# Patient Record
Sex: MALE | Race: WHITE | HISPANIC OR LATINO | URBAN - METROPOLITAN AREA
[De-identification: names, ages, dates, MRNs, and addresses within clinical notes are randomized per-mention and may not be internally consistent; named-entity substitution may affect disease eponyms.]

---

## 2023-09-20 ENCOUNTER — LAB OUTSIDE AN ENCOUNTER (OUTPATIENT)
Dept: URBAN - METROPOLITAN AREA CLINIC 63 | Facility: CLINIC | Age: 63
End: 2023-09-20

## 2023-09-20 ENCOUNTER — OFFICE VISIT (OUTPATIENT)
Dept: URBAN - METROPOLITAN AREA CLINIC 63 | Facility: CLINIC | Age: 63
End: 2023-09-20
Payer: SELF-PAY

## 2023-09-20 ENCOUNTER — WEB ENCOUNTER (OUTPATIENT)
Dept: URBAN - METROPOLITAN AREA CLINIC 63 | Facility: CLINIC | Age: 63
End: 2023-09-20

## 2023-09-20 VITALS
OXYGEN SATURATION: 98 % | SYSTOLIC BLOOD PRESSURE: 138 MMHG | DIASTOLIC BLOOD PRESSURE: 82 MMHG | WEIGHT: 204 LBS | TEMPERATURE: 98.1 F | HEIGHT: 69 IN | HEART RATE: 78 BPM | BODY MASS INDEX: 30.21 KG/M2

## 2023-09-20 DIAGNOSIS — R63.4 ABNORMAL WEIGHT LOSS: ICD-10-CM

## 2023-09-20 DIAGNOSIS — K21.9 GASTROESOPHAGEAL REFLUX DISEASE WITHOUT ESOPHAGITIS: ICD-10-CM

## 2023-09-20 DIAGNOSIS — K59.09 OTHER CONSTIPATION: ICD-10-CM

## 2023-09-20 DIAGNOSIS — R10.13 EPIGASTRIC PAIN: ICD-10-CM

## 2023-09-20 DIAGNOSIS — Z12.11 ENCOUNTER FOR SCREENING FOR MALIGNANT NEOPLASM OF COLON: ICD-10-CM

## 2023-09-20 PROBLEM — 266435005: Status: ACTIVE | Noted: 2023-09-20

## 2023-09-20 PROCEDURE — 99204 OFFICE O/P NEW MOD 45 MIN: CPT | Performed by: PHYSICIAN ASSISTANT

## 2023-09-20 RX ORDER — POLYETHYLENE GLYCOL 3350, SODIUM CHLORIDE, SODIUM BICARBONATE, POTASSIUM CHLORIDE 420; 11.2; 5.72; 1.48 G/4L; G/4L; G/4L; G/4L
AS DIRECTED POWDER, FOR SOLUTION ORAL ONCE
Qty: 4000 | Refills: 0 | OUTPATIENT
Start: 2023-09-20 | End: 2023-09-21

## 2023-09-20 RX ORDER — FAMOTIDINE 20 MG/1
1 TABLET AS NEEDED TABLET, FILM COATED ORAL ONCE A DAY
Qty: 30 TABLET | Refills: 2 | OUTPATIENT
Start: 2023-09-20

## 2023-09-20 RX ORDER — DICYCLOMINE HYDROCHLORIDE 10 MG/1
2 CAPSULES CAPSULE ORAL THREE TIMES A DAY
Status: ACTIVE | COMMUNITY

## 2023-09-20 RX ORDER — ONDANSETRON HYDROCHLORIDE 4 MG/1
1 TABLET TABLET, FILM COATED ORAL
Qty: 2 | Refills: 0 | OUTPATIENT
Start: 2023-09-20

## 2023-09-20 NOTE — PHYSICAL EXAM CHEST:
chest wall non-tender, breathing is unlabored without accessory muscle use, normal breath sounds n/a

## 2023-09-20 NOTE — HPI-TODAY'S VISIT:
Elías is a pleasant 62-year-old male who presents today for evaluation of a screening colonoscopy  CT abdomen/pelvis with contrast dated 8/13/2023 showed scattered diverticulum.  No evidence of diverticulitis.  No focal inflammatory process.  Fluid-filled stomach, small intestinal loops.  Findings suggesting gastroenteritis  Labs dated 8/13/2023 showed a normal hemoglobin.  Normal platelets.  Normal renal function.  Normal LFTs.  Normal lipase.  Patient is EGD and colonoscopy naive. PCP gave him Bentyl but this was ineffective. He notes epigastric pain and intermittent reflux at night. Notes a bowel movement every 2-3 days despite prune juice. 20 pound weight loss over the last month unintentionally. Denies nausea, vomiting, dysphagia, odynophagia, hematemesis, coffee ground emesis, BRBPR or melena. Denies family history of colon cancer or colon polyps. No other GI complaints at this time

## 2023-09-27 ENCOUNTER — LAB OUTSIDE AN ENCOUNTER (OUTPATIENT)
Dept: URBAN - METROPOLITAN AREA CLINIC 63 | Facility: CLINIC | Age: 63
End: 2023-09-27

## 2023-10-05 ENCOUNTER — TELEPHONE ENCOUNTER (OUTPATIENT)
Dept: URBAN - METROPOLITAN AREA CLINIC 63 | Facility: CLINIC | Age: 63
End: 2023-10-05

## 2023-10-05 RX ORDER — POLYETHYLENE GLYCOL 3350, SODIUM CHLORIDE, SODIUM BICARBONATE, POTASSIUM CHLORIDE 420; 11.2; 5.72; 1.48 G/4L; G/4L; G/4L; G/4L
AS DIRECTED POWDER, FOR SOLUTION ORAL ONCE
Qty: 4000 | Refills: 0
Start: 2023-09-20 | End: 2023-10-06

## 2023-10-24 ENCOUNTER — OUT OF OFFICE VISIT (OUTPATIENT)
Dept: URBAN - METROPOLITAN AREA SURGERY CENTER 4 | Facility: SURGERY CENTER | Age: 63
End: 2023-10-24
Payer: SELF-PAY

## 2023-10-24 ENCOUNTER — CLAIMS CREATED FROM THE CLAIM WINDOW (OUTPATIENT)
Dept: URBAN - METROPOLITAN AREA CLINIC 4 | Facility: CLINIC | Age: 63
End: 2023-10-24
Payer: SELF-PAY

## 2023-10-24 DIAGNOSIS — K22.89 OTHER SPECIFIED DISEASE OF ESOPHAGUS: ICD-10-CM

## 2023-10-24 DIAGNOSIS — B96.81 HELICOBACTER PYLORI [H. PYLORI] AS THE CAUSE OF DISEASES CLASSIFIED ELSEWHERE: ICD-10-CM

## 2023-10-24 DIAGNOSIS — R10.13 EPIGASTRIC ABDOMINAL PAIN: ICD-10-CM

## 2023-10-24 DIAGNOSIS — K21.9 ESOPHAGEAL REFLUX: ICD-10-CM

## 2023-10-24 DIAGNOSIS — K63.5 POLYP OF TRANSVERSE COLON, UNSPECIFIED TYPE: ICD-10-CM

## 2023-10-24 DIAGNOSIS — K64.8 OTHER HEMORRHOIDS: ICD-10-CM

## 2023-10-24 DIAGNOSIS — K31.89 OTHER DISEASES OF STOMACH AND DUODENUM: ICD-10-CM

## 2023-10-24 DIAGNOSIS — K63.5 BENIGN COLON POLYPS: ICD-10-CM

## 2023-10-24 DIAGNOSIS — K29.90 GASTRODUODENITIS, UNSPECIFIED, WITHOUT BLEEDING: ICD-10-CM

## 2023-10-24 DIAGNOSIS — Z12.11 ENCOUNTER FOR SCREENING FOR MALIGNANT NEOPLASM OF COLON: ICD-10-CM

## 2023-10-24 DIAGNOSIS — K29.60 OTHER GASTRITIS WITHOUT BLEEDING: ICD-10-CM

## 2023-10-24 DIAGNOSIS — Z12.11 COLON CANCER SCREENING (HIGH RISK): ICD-10-CM

## 2023-10-24 PROCEDURE — 45380 COLONOSCOPY AND BIOPSY: CPT | Performed by: INTERNAL MEDICINE

## 2023-10-24 PROCEDURE — 43239 EGD BIOPSY SINGLE/MULTIPLE: CPT | Performed by: INTERNAL MEDICINE

## 2023-10-24 PROCEDURE — 88312 SPECIAL STAINS GROUP 1: CPT | Performed by: PATHOLOGY

## 2023-10-24 PROCEDURE — 88305 TISSUE EXAM BY PATHOLOGIST: CPT | Performed by: PATHOLOGY

## 2023-10-24 PROCEDURE — 00813 ANES UPR LWR GI NDSC PX: CPT | Performed by: NURSE ANESTHETIST, CERTIFIED REGISTERED

## 2023-10-24 RX ORDER — ONDANSETRON HYDROCHLORIDE 4 MG/1
1 TABLET TABLET, FILM COATED ORAL
Qty: 2 | Refills: 0 | Status: ACTIVE | COMMUNITY
Start: 2023-09-20

## 2023-10-24 RX ORDER — FAMOTIDINE 20 MG/1
1 TABLET AS NEEDED TABLET, FILM COATED ORAL ONCE A DAY
Qty: 30 TABLET | Refills: 2 | Status: ACTIVE | COMMUNITY
Start: 2023-09-20

## 2023-10-24 RX ORDER — DICYCLOMINE HYDROCHLORIDE 10 MG/1
2 CAPSULES CAPSULE ORAL THREE TIMES A DAY
Status: ACTIVE | COMMUNITY

## 2023-11-01 ENCOUNTER — TELEPHONE ENCOUNTER (OUTPATIENT)
Dept: URBAN - METROPOLITAN AREA CLINIC 63 | Facility: CLINIC | Age: 63
End: 2023-11-01

## 2023-11-01 RX ORDER — OMEPRAZOLE 40 MG/1
1 CAPSULE 30 MINUTES BEFORE MORNING 1 CAPSULE 30MIN BEFORE DINNER MEAL CAPSULE, DELAYED RELEASE ORAL
Qty: 28 | Refills: 0 | OUTPATIENT
Start: 2023-11-01

## 2023-11-01 RX ORDER — CLARITHROMYCIN 500 MG/1
1 TABLET TABLET, FILM COATED ORAL
Qty: 28 TABLET | Refills: 0 | OUTPATIENT
Start: 2023-11-01 | End: 2023-11-14

## 2023-11-01 RX ORDER — ONDANSETRON HYDROCHLORIDE 4 MG/1
1 TABLET TABLET, FILM COATED ORAL
Qty: 2 | Refills: 0
Start: 2023-09-20

## 2023-11-01 RX ORDER — DICYCLOMINE HYDROCHLORIDE 10 MG/1: 2 CAPSULES CAPSULE ORAL THREE TIMES A DAY

## 2023-11-01 RX ORDER — FAMOTIDINE 20 MG/1
1 TABLET AS NEEDED TABLET, FILM COATED ORAL ONCE A DAY
Qty: 30 TABLET | Refills: 2
Start: 2023-09-20

## 2023-11-01 RX ORDER — AMOXICILLIN 500 MG/1
2 CAPSULES CAPSULE ORAL
Qty: 56 CAPSULE | Refills: 0 | OUTPATIENT
Start: 2023-11-01 | End: 2023-11-14

## 2023-11-03 ENCOUNTER — ERX REFILL RESPONSE (OUTPATIENT)
Dept: URBAN - METROPOLITAN AREA CLINIC 63 | Facility: CLINIC | Age: 63
End: 2023-11-03

## 2023-11-03 RX ORDER — OMEPRAZOLE 40 MG/1
1 CAPSULE 30 MINUTES BEFORE MORNING 1 CAPSULE 30MIN BEFORE DINNER MEAL CAPSULE, DELAYED RELEASE ORAL
Qty: 28 | Refills: 0 | OUTPATIENT

## 2023-11-03 RX ORDER — OMEPRAZOLE 40 MG/1
1 CAPSULE 30 MINUTES BEFORE MORNING MEAL 1 CAPSULE 30MIN BEFORE DNNER CAPSULE, DELAYED RELEASE ORAL
Qty: 28 | Refills: 0 | OUTPATIENT

## 2023-11-13 ENCOUNTER — TELEPHONE ENCOUNTER (OUTPATIENT)
Dept: URBAN - METROPOLITAN AREA CLINIC 64 | Facility: CLINIC | Age: 63
End: 2023-11-13

## 2023-11-29 ENCOUNTER — OFFICE VISIT (OUTPATIENT)
Dept: URBAN - METROPOLITAN AREA CLINIC 63 | Facility: CLINIC | Age: 63
End: 2023-11-29

## 2023-12-13 ENCOUNTER — OFFICE VISIT (OUTPATIENT)
Dept: URBAN - METROPOLITAN AREA CLINIC 63 | Facility: CLINIC | Age: 63
End: 2023-12-13

## 2023-12-13 ENCOUNTER — DASHBOARD ENCOUNTERS (OUTPATIENT)
Age: 63
End: 2023-12-13

## 2023-12-13 RX ORDER — DICYCLOMINE HYDROCHLORIDE 10 MG/1
2 CAPSULES CAPSULE ORAL THREE TIMES A DAY
COMMUNITY

## 2023-12-13 RX ORDER — OMEPRAZOLE 40 MG/1
1 CAPSULE 30 MINUTES BEFORE MORNING MEAL 1 CAPSULE 30MIN BEFORE DNNER CAPSULE, DELAYED RELEASE ORAL
Qty: 28 | Refills: 0 | COMMUNITY

## 2023-12-13 RX ORDER — OMEPRAZOLE 40 MG/1
1 CAPSULE 30 MINUTES BEFORE MORNING 1 CAPSULE 30MIN BEFORE DINNER MEAL CAPSULE, DELAYED RELEASE ORAL
Qty: 28 | Refills: 0 | OUTPATIENT

## 2023-12-13 RX ORDER — FAMOTIDINE 20 MG/1
1 TABLET AS NEEDED TABLET, FILM COATED ORAL ONCE A DAY
Qty: 30 TABLET | Refills: 2 | COMMUNITY
Start: 2023-09-20

## 2023-12-13 RX ORDER — FAMOTIDINE 20 MG/1
1 TABLET AS NEEDED TABLET, FILM COATED ORAL ONCE A DAY
Qty: 30 TABLET | Refills: 2

## 2023-12-13 RX ORDER — ONDANSETRON HYDROCHLORIDE 4 MG/1
1 TABLET TABLET, FILM COATED ORAL
Qty: 2 | Refills: 0